# Patient Record
Sex: FEMALE | Race: BLACK OR AFRICAN AMERICAN | Employment: FULL TIME | URBAN - METROPOLITAN AREA
[De-identification: names, ages, dates, MRNs, and addresses within clinical notes are randomized per-mention and may not be internally consistent; named-entity substitution may affect disease eponyms.]

---

## 2022-11-15 ENCOUNTER — HOSPITAL ENCOUNTER (OUTPATIENT)
Dept: PHYSICAL THERAPY | Age: 22
Discharge: HOME | End: 2022-11-15
Payer: MEDICAID

## 2022-11-15 PROCEDURE — 97161 PT EVAL LOW COMPLEX 20 MIN: CPT

## 2022-11-15 NOTE — PROGRESS NOTES
Physical Therapy Evaluation     Patient Name: Angely Michel  Date:11/15/2022  : 2000  [x]  Patient  Verified  Payor: [employer] MEDICAID / Plan: [facility] [employer] PLUS / Product Type: Managed Care Medicaid /    In time:4:20  Out time:5:15  Total Treatment Time (min): 54  Visit #: 1 of 12    Treatment Area: Urge incontinence [N39.41]    SUBJECTIVE    Current symptoms/Complaints: Pt is a 23year old female who presents to physical therapy with complaints of urge urinary incontinence. Pt has had leaking since the birth of her child on 14. She was pushing for a short amount of time, no tearing. It has gotten worse, especially 2 months ago. She had her OBGYN appt 2 months ago. She saw her PCP who referred her for pelvic floor physical therapy.      Pain Location: bladder  Pain Level (0-10 scale): 7  []constant []intermittent []improving [x]worsening []no change since onset  Pain Quality: aching when she needs to go     Aggravating Factors: waiting to go to the bathroom, urge to urinate  Alleviating Factors: nothing    Birthing Hx: normal, healthy, vaginal delivery 14  Psychosocial Factors/Hx: none  Any medication changes, allergies to medications, adverse drug reactions, diagnosis change, or new procedure performed?: [x] No    [] Yes (see summary sheet for update)    Occupation/Work Hx: work at [email or web address] - on feet all day long; 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  Exercise/Hobbies: none  Pt Goals: wants to know what will help her hold her pee in better and not go as frequently; wants to be able to go on a road trip; wants to be able to participate in her her social life    Urinary Symptoms:     Current urinary complaint  leaks with activity (stress induced)  leaks with urgency  urinary frequency  urinary urgency  bladder pain:  with bladder filling   Pt has to push on bladder sometimes to get out urine     Bladder complaint longevity:  5+ years    Bladder symptom progression:  worsened    Frequency of UI: every 15-30 minutes, urination lasts 5 seconds    Pad wetness when changed:  Doesn't use pads - underwear 5-10 drops worth of wet at end of day     Bowel Symptoms:     Bowel function:  Regular BM, 5-7 per week  Stool Type [facility]): Type 1 - Separate Hard Lumps  Type 2 - Lumpy and Sausage Like  Type 3 - Sausage Shape with Cracks in the Surface  Type 4 - Like a Smooth Sausage or Snake  Type 5 - Soft Blobs with Clear Cut Edges  Type 6 - Mushy Consistency with Ragged Edges   Type 7 - Liquid Consistency with no Solid Pieces    Diet:    Fluid intake:  Fluid  Amount per day   Water 3 - 16 oz bottles/day    Caffeine none   Alcohol 3-4 liquor      Physical Exam Objective Findings:    Pelvic Floor Assessment  Patient was educated on pelvic floor anatomy, structure, and function and implications for current presentation of s/s. Patient consents to pelvic floor assessment.      Observation:  Contraction - minimal  Bulge - normal  Knack - present    PFM Screen:    Skin Integrity:  [x] Healthy [] Red  [] Labia Atrophy [] Fragile    Sensation: [x] Intact [] Diminished:    Muscle Bulk: [] Symmetrical  [] Well-developed [] Atrophied:  []L   []R   []B    Prolapse: none       External trigger point/ muscle tenderness:    Superficial PFM tenderness/restriction   Right/center Left   Bulbocavernosus (bulbospongiosus) none none   Ischiocavernosus none none   Superficial Transverse Perineal none none   Perineal body none    Levator Ani none none            Pelvic floor manual exam: Performed via internal vaginal assessment    Deep PFM Tenderness/Restriction:    Right/center Left   pubococcygeus none none   Ischiococcygeus none none   Iliococcygeus none none   Obturator Internus none none   coccyx Mildly left sidebent             Pelvic floor MMT    PERF (Performance/Endurance/Repetitions//Flicks): 7/6/4/SO    Pelvic muscle release pattern  3 - good release    30 min [x]Eval                  []Re-Eval       15 min Self Care: Review and handout provided on the following: PFM anatomy, structure and function as it pertains to current s/s, complaints and condition. Reviewed expectations for PFPT and POC. Urge drills, bladder education, bladder irritants   Rationale:  Increase awareness and understanding of current condition to improve patients ability to independently and effectively attain goals and progress towards long term management of current condition. 10 min Neuromuscular Re-education: pelvic floor contraction with overflow - hip abduciton blue theraband  []  See flow sheet :   Rationale: increase strength, improve coordination, and increase proprioception  to improve the patients ability to activate pelvic floor without compensation        With   [] TE   [x] TA   [] neuro   [x] other: self care Patient Education: [x] Review HEP    [] Progressed/Changed HEP based on:   [] positioning   [] body mechanics   [] transfers   [] heat/ice application    [] other:      Pain Level (0-10 scale) post treatment: 0    ASSESSMENT/Changes in Function: Patient is a 23year old female presenting to Physical Therapy with c/o urge urinary incontinence which is limiting ability function at previous level. Patient has lower abdominal/bladder pain complaints with holding back urge to go as well as frequent urination (every 15-30 minutes). Patient presents with decreased strength, coordination, and endurance of the pelvic floor muscles and decreased strength of postural stabilizers. Patient presents with poor understanding of bladder and bowel anatomy/function, dietary irritants, and urge suppression. I feel patient would benefit from skilled therapeutic intervention to optimize highest functional level possible.      Patient will continue to benefit from skilled PT services to modify and progress therapeutic interventions, address functional mobility deficits, address ROM deficits, address strength deficits, analyze and address soft tissue restrictions, analyze and cue movement patterns, analyze and modify body mechanics/ergonomics, assess and modify postural abnormalities, and instruct in home and community integration to attain remaining goals. [x]  See Plan of Care  []  See progress note/recertification  []  See Discharge Summary         Progress towards goals / Updated goals:  Short term goals: To be achieved in 6 sessions:  Patient will demonstrate proper dietary/fluid habits and urge suppression strategies that promote bladder and bowel health to aid in management of urinary urgency and incontinence. Status at eval: Patient consuming 40 oz of water and 3-4 alcoholic drinks/week and unaware of bladder fitness, dietary irritants and urge suppression strategies. Patient will increase voiding intervals to every hour at least.  Status at eval: voids every 15-30 minutes    Patient will report improvement in UI complaints evidence by a decrease in pad usage and/or amount of leakage by 25-50% to improve QOL. Status at eval: Patient's underwear is wet (5-10 drops) by end of day, aware of when she is leaking (with urge and after urinating sometimes)    Patient will be able to maintain pelvic floor contraction for 7 seconds, 7 repetitions with no accessory substitution or breath holding. Status at eval: PFMC 4 seconds, 4 repetitions     Long term goals: To be achieved in 12 sessions:  Patient will report bladder continence % of the time with cough/sneeze/laugh and walking to the toilet. Status at eval: patient stress and urgency incontinence 3-5 times  per day    Patient will demonstrate pelvic floor muscle contraction at least 3+/5 and ability to maintain contraction for 10 seconds, 10 repetitions.    Status at eval: PFM 2/5, 4 second hold, 4 repetitions    Patient will increase voiding intervals to every 2 hours at least.  Status at eval: voids every 15-30 minutes    Patient will demonstrate improvement of current complaints evidenced by a 10 point  improvement in FOTO, Pelvic functional disability index score  Status at Eval: Pelvic functional disability index 49    Patient will demonstrate independence with management tools and exercise program that are beneficial for current condition in order to feel comfortable with Pelvic floor PT D/C and not fear exacerbation of current condition or symptoms returning.    Status at eval : patient fearful of return to exercise and unaware of what activities to avoid to avoid exacerbation of current condition    PLAN  []  Upgrade activities as tolerated     []  Continue plan of care  []  Update interventions per flow sheet       []  Discharge due to:_  [x]  Other: initiate plan of care    Ansley Swenson, PT 11/15/2022  1:13 PM

## 2022-11-15 NOTE — THERAPY EVALUATION
[facility] at [facility]  [address] [place], [address]  Ph [phone]  Fx [phone]    Plan of Care/ Statement of Necessity for Physical Therapy Services    Patient name: Guilherme Hendricks of Care: 11/15/2022   Referral source: HiamieGabriel,* : 2000    Medical Diagnosis: Urge incontinence [N39.41]   Onset Date:14    Treatment Diagnosis: Urge incontinence [N39.41]   Prior Hospitalization: see medical history Provider#: [id number]   Medications: Verified on Patient summary List    Comorbidities: depression   Prior Level of Function: pt did not leak prior to the birth of her child in  and leaked much less prior to 2022          The [address] [place] [address] and following information is based on the information from the initial evaluation. Assessment/ key information: Pt is a 23year old female who presents to physical therapy with complaints of urge urinary incontinence. Pt has had leaking since the birth of her child on 14. She was pushing for a short amount of time, no tearing. It has gotten worse, especially 2 months ago. She had her OBGYN appt 2 months ago. She saw her PCP who referred her for pelvic floor physical therapy.      Evaluation Complexity History LOW Complexity : Zero comorbidities / personal factors that will impact the outcome / POC; Examination LOW Complexity : 1-2 Standardized tests and measures addressing body structure, function, activity limitation and / or participation in recreation  ;Presentation LOW Complexity : Stable, uncomplicated  ;Clinical Decision Making MEDIUM Complexity : FOTO score of 26-74 FOTO score = an established functional score where 100 = no disability  Overall Complexity Rating: LOW     Problem List: Decreased pelvic floor mm awareness, Decreased pelvic floor mm strength, Improper voiding habits, and Urinary urgency  Treatment Plan may include any combination of the following: Therapeutic exercise, Urge suppression techniques, Neuromuscular re-education, Manual therapy, and Patient education  Patient / Family readiness to learn indicated by: asking questions, trying to perform skills, and interest  Persons(s) to be included in education: patient (P)  Barriers to Learning/Limitations: None  Patient Goal (s): wants to know what will help her hold her pee in better and not go as frequently; wants to be able to go on a road trip; wants to be able to participate in her her social life  Patient Self Reported Health Status: excellent  Rehabilitation Potential: excellent    Short term goals: To be achieved in 6 sessions:  Patient will demonstrate proper dietary/fluid habits and urge suppression strategies that promote bladder and bowel health to aid in management of urinary urgency and incontinence. Status at eval: Patient consuming 40 oz of water and 3-4 alcoholic drinks/week and unaware of bladder fitness, dietary irritants and urge suppression strategies. Patient will increase voiding intervals to every hour at least.  Status at eval: voids every 15-30 minutes     Patient will report improvement in UI complaints evidence by a decrease in pad usage and/or amount of leakage by 25-50% to improve QOL. Status at eval: Patient's underwear is wet (5-10 drops) by end of day, aware of when she is leaking (with urge and after urinating sometimes)     Patient will be able to maintain pelvic floor contraction for 7 seconds, 7 repetitions with no accessory substitution or breath holding. Status at eval: PFMC 4 seconds, 4 repetitions      Long term goals: To be achieved in 12 sessions:  Patient will report bladder continence % of the time with cough/sneeze/laugh and walking to the toilet. Status at eval: patient stress and urgency incontinence 3-5 times  per day     Patient will demonstrate pelvic floor muscle contraction at least 3+/5 and ability to maintain contraction for 10 seconds, 10 repetitions.    Status at eval: PFM 2/5, 4 second hold, 4 repetitions     Patient will increase voiding intervals to every 2 hours at least.  Status at eval: voids every 15-30 minutes     Patient will demonstrate improvement of current complaints evidenced by a 10 point  improvement in FOTO, Pelvic functional disability index score  Status at Eval: Pelvic functional disability index 49     Patient will demonstrate independence with management tools and exercise program that are beneficial for current condition in order to feel comfortable with Pelvic floor PT D/C and not fear exacerbation of current condition or symptoms returning. Status at eval : patient fearful of return to exercise and unaware of what activities to avoid to avoid exacerbation of current condition       Frequency / Duration: Patient to be seen for 12 sessions. Patient/ Caregiver education and instruction: Diagnosis, prognosis, Proper Voiding Habits, Diet, Pain Management, Exercises, and Bladder Retraining   [x]  Plan of care has been reviewed with PTA    Certification Period: 11/15/2022 - 1/31/2023    Marisol Aguiar, PT 11/15/2022 5:38 PM    ________________________________________________________________________    I certify that the above Therapy Services are being furnished while the patient is under my care. I agree with the treatment plan and certify that this therapy is necessary.     Physician's Signature:____________________  Date:____________Time:____________                                      Gabriel Corbett I,*      Please sign and return to [facility] at [facility]  [address], [address]  Ph [phone]  Fx [phone]

## 2022-12-01 ENCOUNTER — TELEPHONE (OUTPATIENT)
Dept: PHYSICAL THERAPY | Age: 22
End: 2022-12-01

## 2022-12-08 ENCOUNTER — TELEPHONE (OUTPATIENT)
Dept: PHYSICAL THERAPY | Age: 22
End: 2022-12-08

## 2022-12-14 ENCOUNTER — TELEPHONE (OUTPATIENT)
Dept: PHYSICAL THERAPY | Age: 22
End: 2022-12-14

## 2022-12-20 NOTE — PROGRESS NOTES
[facility] at [facility]  [address], [facility], [address]  Phone [phone]  Fax [phone]    Physical Therapy Discharge Summary    Patient name: Toshia Lugo of Care: 11/15/2022   Referral source: Gabriel Corbett,* : 2000               Medical Diagnosis: Urge incontinence [N39.41]    Onset Date:14               Treatment Diagnosis: Urge incontinence [N39.41]   Prior Hospitalization: see medical history Provider#: [id number]   Medications: Verified on Patient summary List    Comorbidities: depression   Prior Level of Function: pt did not leak prior to the birth of her child in  and leaked much less prior to 2022    Visits from Start of Care: 1    Missed Visits: 3    Reporting Period : 11/15/22 to 11/15/22    Assessment / Summary of Care:  Unable to formally assess goals as patient has failed to show for scheduled followup appointments. Please see below for the most recent goals assessment while patient was under the care of this PT clinic. D/C from skilled PT at this time. A new order will be required should further physical therapy services be necessary. Thank you for the referral to [facility]. Short term goals: To be achieved in 6 sessions:  Patient will demonstrate proper dietary/fluid habits and urge suppression strategies that promote bladder and bowel health to aid in management of urinary urgency and incontinence. Status at eval: Patient consuming 40 oz of water and 3-4 alcoholic drinks/week and unaware of bladder fitness, dietary irritants and urge suppression strategies. Patient will increase voiding intervals to every hour at least.  Status at eval: voids every 15-30 minutes     Patient will report improvement in UI complaints evidence by a decrease in pad usage and/or amount of leakage by 25-50% to improve QOL.   Status at eval: Patient's underwear is wet (5-10 drops) by end of day, aware of when she is leaking (with urge and after urinating sometimes)     Patient will be able to maintain pelvic floor contraction for 7 seconds, 7 repetitions with no accessory substitution or breath holding. Status at eval: PFMC 4 seconds, 4 repetitions      Long term goals: To be achieved in 12 sessions:  Patient will report bladder continence % of the time with cough/sneeze/laugh and walking to the toilet. Status at eval: patient stress and urgency incontinence 3-5 times  per day     Patient will demonstrate pelvic floor muscle contraction at least 3+/5 and ability to maintain contraction for 10 seconds, 10 repetitions. Status at eval: PFM 2/5, 4 second hold, 4 repetitions     Patient will increase voiding intervals to every 2 hours at least.  Status at eval: voids every 15-30 minutes     Patient will demonstrate improvement of current complaints evidenced by a 10 point  improvement in FOTO, Pelvic functional disability index score  Status at Eval: Pelvic functional disability index 49     Patient will demonstrate independence with management tools and exercise program that are beneficial for current condition in order to feel comfortable with Pelvic floor PT D/C and not fear exacerbation of current condition or symptoms returning. Status at eval : patient fearful of return to exercise and unaware of what activities to avoid to avoid exacerbation of current condition    RECOMMENDATIONS:  [x]Discontinue therapy: []Patient has reached or is progressing toward set goals      [x]Patient is non-compliant or has abdicated      []Due to lack of appreciable progress towards set goals    Thank you for the referral to [facility]! Hi Rangel, PT  12/20/2022   6:27 PM    ------------------------------------------------------------------------------------------------------------------------  NOTE TO PHYSICIAN:  Please complete the following and fax to:    [facility] at [facility] at [phone]  If you are unable to process this request in   24 hours, please contact our office. [] I have read the above report and request that my patient continue therapy with the following changes/special instructions:  [] I have read the above report and request that my patient be discharged from therapy.     [de-identified] Signature:____________________ Date:_________ TIME:________                                        Gabriel Corbett I,*      ** Signature, Date and Time must be completed for valid certification **

## 2022-12-21 ENCOUNTER — APPOINTMENT (OUTPATIENT)
Dept: PHYSICAL THERAPY | Age: 22
End: 2022-12-21

## 2022-12-28 ENCOUNTER — APPOINTMENT (OUTPATIENT)
Dept: PHYSICAL THERAPY | Age: 22
End: 2022-12-28